# Patient Record
Sex: FEMALE | Race: BLACK OR AFRICAN AMERICAN | URBAN - METROPOLITAN AREA
[De-identification: names, ages, dates, MRNs, and addresses within clinical notes are randomized per-mention and may not be internally consistent; named-entity substitution may affect disease eponyms.]

---

## 2020-06-18 ENCOUNTER — OFFICE VISIT (OUTPATIENT)
Dept: URBAN - METROPOLITAN AREA TELEHEALTH 2 | Facility: TELEHEALTH | Age: 60
End: 2020-06-18
Payer: COMMERCIAL

## 2020-06-18 DIAGNOSIS — K29.70 GASTRITIS, UNSPECIFIED, WITHOUT BLEEDING: ICD-10-CM

## 2020-06-18 DIAGNOSIS — K21.0 GASTROESOPHAGEAL REFLUX DISEASE WITH ESOPHAGITIS: ICD-10-CM

## 2020-06-18 DIAGNOSIS — B96.81 HELICOBACTER PYLORI [H. PYLORI] AS THE CAUSE OF DISEASES CLASSIFIED ELSEWHERE: ICD-10-CM

## 2020-06-18 DIAGNOSIS — R63.5 WEIGHT GAIN: ICD-10-CM

## 2020-06-18 DIAGNOSIS — K63.5 POLYP OF COLON, UNSPECIFIED PART OF COLON, UNSPECIFIED TYPE: ICD-10-CM

## 2020-06-18 DIAGNOSIS — E66.01 MORBID OBESITY: ICD-10-CM

## 2020-06-18 DIAGNOSIS — K59.09 OTHER CONSTIPATION: ICD-10-CM

## 2020-06-18 PROCEDURE — G8417 CALC BMI ABV UP PARAM F/U: HCPCS | Performed by: INTERNAL MEDICINE

## 2020-06-18 PROCEDURE — 99213 OFFICE O/P EST LOW 20 MIN: CPT | Performed by: INTERNAL MEDICINE

## 2020-06-18 PROCEDURE — G8427 DOCREV CUR MEDS BY ELIG CLIN: HCPCS | Performed by: INTERNAL MEDICINE

## 2020-06-18 PROCEDURE — 1036F TOBACCO NON-USER: CPT | Performed by: INTERNAL MEDICINE

## 2020-06-18 PROCEDURE — G9903 PT SCRN TBCO ID AS NON USER: HCPCS | Performed by: INTERNAL MEDICINE

## 2020-06-18 PROCEDURE — 3017F COLORECTAL CA SCREEN DOC REV: CPT | Performed by: INTERNAL MEDICINE

## 2020-06-18 RX ORDER — OXYCODONE AND ACETAMINOPHEN 10; 325 MG/1; MG/1
TABLET ORAL
Qty: 0 | Refills: 0 | Status: DISCONTINUED | COMMUNITY
Start: 1900-01-01

## 2020-06-18 RX ORDER — OMEPRAZOLE 40 MG/1
1 CAPSULE 30 MINUTES BEFORE MORNING MEAL CAPSULE, DELAYED RELEASE ORAL ONCE A DAY
Status: ACTIVE | COMMUNITY

## 2020-06-18 RX ORDER — OXYCODONE HYDROCHLORIDE AND ACETAMINOPHEN 10; 325 MG/1; MG/1
TABLET ORAL
Qty: 0 | Refills: 0 | COMMUNITY
Start: 1900-01-01

## 2020-06-18 RX ORDER — OMEPRAZOLE 40 MG/1
1 CAPSULE 30 MINUTES BEFORE MORNING MEAL CAPSULE, DELAYED RELEASE ORAL ONCE A DAY
Qty: 90 | Refills: 0 | OUTPATIENT
Start: 2020-06-18

## 2020-06-18 NOTE — HPI-TODAY'S VISIT:
11/9/2018 54 yo lady referred by Dr. Bety Rubio. She says taking omeprazole during the day and then being dietarily compliant and not eating for 3 hours before lying down has her acid reflux controlled.  6/18/20 Still having "really bad acid reflux".  Says she still has significant throat burning, regurgitation, back pain, sometimes really severe and at night as well. Worse when she eats late" or something I should not eat". She tries to avoid her triggers including fried and spicy foods and is  not usually successful. She does not vomit and no dysphagia.  She has gained some weight with the pandemic.  She tries to eat dinner before 7 pm but ends up snacking afterwards. She can snack up till 11 pm when she goes to bed.  She takes Omeprazole daily; used to take only prn until recently. "Im on my last ones now". She is on 40 mg daily.

## 2020-07-16 ENCOUNTER — OFFICE VISIT (OUTPATIENT)
Dept: URBAN - METROPOLITAN AREA TELEHEALTH 2 | Facility: TELEHEALTH | Age: 60
End: 2020-07-16
Payer: COMMERCIAL

## 2020-07-16 DIAGNOSIS — E66.01 MORBID OBESITY: ICD-10-CM

## 2020-07-16 DIAGNOSIS — K63.5 POLYP OF COLON, UNSPECIFIED PART OF COLON, UNSPECIFIED TYPE: ICD-10-CM

## 2020-07-16 DIAGNOSIS — K59.09 OTHER CONSTIPATION: ICD-10-CM

## 2020-07-16 DIAGNOSIS — R63.5 WEIGHT GAIN: ICD-10-CM

## 2020-07-16 DIAGNOSIS — K29.70 GASTRITIS, UNSPECIFIED, WITHOUT BLEEDING: ICD-10-CM

## 2020-07-16 DIAGNOSIS — B96.81 HELICOBACTER PYLORI [H. PYLORI] AS THE CAUSE OF DISEASES CLASSIFIED ELSEWHERE: ICD-10-CM

## 2020-07-16 DIAGNOSIS — K21.0 GASTROESOPHAGEAL REFLUX DISEASE WITH ESOPHAGITIS: ICD-10-CM

## 2020-07-16 PROCEDURE — 99213 OFFICE O/P EST LOW 20 MIN: CPT | Performed by: INTERNAL MEDICINE

## 2020-07-16 PROCEDURE — G8417 CALC BMI ABV UP PARAM F/U: HCPCS | Performed by: INTERNAL MEDICINE

## 2020-07-16 PROCEDURE — G9903 PT SCRN TBCO ID AS NON USER: HCPCS | Performed by: INTERNAL MEDICINE

## 2020-07-16 PROCEDURE — 1036F TOBACCO NON-USER: CPT | Performed by: INTERNAL MEDICINE

## 2020-07-16 PROCEDURE — G8427 DOCREV CUR MEDS BY ELIG CLIN: HCPCS | Performed by: INTERNAL MEDICINE

## 2020-07-16 PROCEDURE — 3017F COLORECTAL CA SCREEN DOC REV: CPT | Performed by: INTERNAL MEDICINE

## 2020-07-16 RX ORDER — OMEPRAZOLE 40 MG/1
1 CAPSULE 30 MINUTES BEFORE MORNING MEAL CAPSULE, DELAYED RELEASE ORAL ONCE A DAY
Qty: 90 | Refills: 0 | COMMUNITY
Start: 2020-06-18

## 2020-07-16 RX ORDER — OMEPRAZOLE 40 MG/1
1 CAPSULE 30 MINUTES BEFORE MORNING MEAL CAPSULE, DELAYED RELEASE ORAL TWICE A DAY
Qty: 180 | Refills: 1 | OUTPATIENT

## 2020-07-16 RX ORDER — OMEPRAZOLE 40 MG/1
1 CAPSULE 30 MINUTES BEFORE MORNING MEAL CAPSULE, DELAYED RELEASE ORAL ONCE A DAY
COMMUNITY

## 2020-07-16 RX ORDER — OXYCODONE HYDROCHLORIDE AND ACETAMINOPHEN 10; 325 MG/1; MG/1
TABLET ORAL
Qty: 0 | Refills: 0 | COMMUNITY
Start: 1900-01-01

## 2020-07-16 NOTE — HPI-TODAY'S VISIT:
11/9/2018 56 yo lady referred by Dr. Bety Rubio. She says taking omeprazole during the day and then being dietarily compliant and not eating for 3 hours before lying down has her acid reflux controlled.  6/18/20 Still having "really bad acid reflux".  Says she still has significant throat burning, regurgitation, back pain, sometimes really severe and at night as well. Worse when she eats late" or something I should not eat". She tries to avoid her triggers including fried and spicy foods and is  not usually successful. She does not vomit and no dysphagia.  She has gained some weight with the pandemic.  She tries to eat dinner before 7 pm but ends up snacking afterwards. She can snack up till 11 pm when she goes to bed.  She takes Omeprazole daily; used to take only prn until recently. "Im on my last ones now". She is on 40 mg daily.   7/16/20 Omeprazole 40 mg bid "it really helped, one wasnt doing it". She had dinner about 7 pm last night. She had chips 2 nights ago at about 8 or 9 pm. "Gerri been trying not to have anything late". She can tell that she has a burning sensation in her throat or a globus sensation when she is dietarily indiscrete. She says her weight is "staying pretty much the same".

## 2021-06-01 ENCOUNTER — TELEPHONE ENCOUNTER (OUTPATIENT)
Dept: URBAN - METROPOLITAN AREA CLINIC 23 | Facility: CLINIC | Age: 61
End: 2021-06-01

## 2021-06-01 RX ORDER — OMEPRAZOLE 40 MG/1
1 CAPSULE 30 MINUTES BEFORE MORNING MEAL CAPSULE, DELAYED RELEASE ORAL ONCE A DAY
Qty: 30 | Refills: 0

## 2021-07-27 ENCOUNTER — LAB OUTSIDE AN ENCOUNTER (OUTPATIENT)
Dept: URBAN - METROPOLITAN AREA CLINIC 17 | Facility: CLINIC | Age: 61
End: 2021-07-27

## 2021-07-27 ENCOUNTER — OFFICE VISIT (OUTPATIENT)
Dept: URBAN - METROPOLITAN AREA CLINIC 17 | Facility: CLINIC | Age: 61
End: 2021-07-27
Payer: COMMERCIAL

## 2021-07-27 ENCOUNTER — WEB ENCOUNTER (OUTPATIENT)
Dept: URBAN - METROPOLITAN AREA CLINIC 17 | Facility: CLINIC | Age: 61
End: 2021-07-27

## 2021-07-27 DIAGNOSIS — K21.00 ALKALINE REFLUX ESOPHAGITIS: ICD-10-CM

## 2021-07-27 DIAGNOSIS — K29.60 ADENOPAPILLOMATOSIS GASTRICA: ICD-10-CM

## 2021-07-27 DIAGNOSIS — K59.09 OTHER CONSTIPATION: ICD-10-CM

## 2021-07-27 DIAGNOSIS — B96.81 HELICOBACTER PYLORI [H. PYLORI] AS THE CAUSE OF DISEASES CLASSIFIED ELSEWHERE: ICD-10-CM

## 2021-07-27 PROBLEM — 6185008: Status: ACTIVE | Noted: 2020-06-17

## 2021-07-27 PROBLEM — 89538001: Status: ACTIVE | Noted: 2020-06-17

## 2021-07-27 PROBLEM — 8943002: Status: ACTIVE | Noted: 2020-06-17

## 2021-07-27 PROBLEM — 238136002 MORBID OBESITY: Status: ACTIVE | Noted: 2021-07-27

## 2021-07-27 PROBLEM — 14760008: Status: ACTIVE | Noted: 2020-06-17

## 2021-07-27 PROCEDURE — 99214 OFFICE O/P EST MOD 30 MIN: CPT | Performed by: INTERNAL MEDICINE

## 2021-07-27 RX ORDER — OMEPRAZOLE 40 MG/1
1 CAPSULE 30 MINUTES BEFORE MORNING MEAL CAPSULE, DELAYED RELEASE ORAL TWICE A DAY
Qty: 180 | Refills: 3 | OUTPATIENT

## 2021-07-27 RX ORDER — OMEPRAZOLE 40 MG/1
1 CAPSULE 30 MINUTES BEFORE MORNING MEAL CAPSULE, DELAYED RELEASE ORAL ONCE A DAY
Status: DISCONTINUED | COMMUNITY

## 2021-07-27 RX ORDER — OXYCODONE HYDROCHLORIDE AND ACETAMINOPHEN 10; 325 MG/1; MG/1
TABLET ORAL
Qty: 0 | Refills: 0 | Status: ACTIVE | COMMUNITY
Start: 1900-01-01

## 2021-07-27 RX ORDER — OMEPRAZOLE 40 MG/1
1 CAPSULE 30 MINUTES BEFORE MORNING MEAL CAPSULE, DELAYED RELEASE ORAL ONCE A DAY
Qty: 30 | Refills: 0 | Status: ACTIVE | COMMUNITY

## 2021-07-27 NOTE — HPI-TODAY'S VISIT:
11/9/2018 56 yo lady referred by Dr. Bety Rubio. She says taking omeprazole during the day and then being dietarily compliant and not eating for 3 hours before lying down has her acid reflux controlled.  6/18/20 Still having "really bad acid reflux".  Says she still has significant throat burning, regurgitation, back pain, sometimes really severe and at night as well. Worse when she eats late" or something I should not eat". She tries to avoid her triggers including fried and spicy foods and is  not usually successful. She does not vomit and no dysphagia.  She has gained some weight with the pandemic.  She tries to eat dinner before 7 pm but ends up snacking afterwards. She can snack up till 11 pm when she goes to bed.  She takes Omeprazole daily; used to take only prn until recently. "Im on my last ones now". She is on 40 mg daily.   7/16/20 Omeprazole 40 mg bid "it really helped, one wasnt doing it". She had dinner about 7 pm last night. She had chips 2 nights ago at about 8 or 9 pm. "Gerri been trying not to have anything late". She can tell that she has a burning sensation in her throat or a globus sensation when she is dietarily indiscrete. She says her weight is "staying pretty much the same".   7/27/21 Doing fairly well Taking Omeprazole 40 mg once or twice a day/ When she takes bid and is dietarily discrete, sheis well controlled. Aggravating factors include caffeine, chocolate, sodas, late night eating.  She has regular BMs. Says Omeprazole works better than nexium.  Discussed colonoscopy. She is ready to schedule. Wants something for nausea.

## 2022-08-16 ENCOUNTER — TELEPHONE ENCOUNTER (OUTPATIENT)
Dept: URBAN - METROPOLITAN AREA CLINIC 92 | Facility: CLINIC | Age: 62
End: 2022-08-16

## 2022-08-16 RX ORDER — OMEPRAZOLE 40 MG/1
1 CAPSULE 30 MINUTES BEFORE MORNING MEAL CAPSULE, DELAYED RELEASE ORAL TWICE A DAY
Qty: 60 | Refills: 0

## 2023-11-02 ENCOUNTER — DASHBOARD ENCOUNTERS (OUTPATIENT)
Age: 63
End: 2023-11-02

## 2023-11-02 ENCOUNTER — OFFICE VISIT (OUTPATIENT)
Dept: URBAN - METROPOLITAN AREA CLINIC 17 | Facility: CLINIC | Age: 63
End: 2023-11-02
Payer: COMMERCIAL

## 2023-11-02 VITALS
TEMPERATURE: 97.3 F | HEART RATE: 60 BPM | WEIGHT: 293 LBS | BODY MASS INDEX: 47.09 KG/M2 | SYSTOLIC BLOOD PRESSURE: 106 MMHG | HEIGHT: 66 IN | DIASTOLIC BLOOD PRESSURE: 70 MMHG

## 2023-11-02 DIAGNOSIS — K59.09 OTHER CONSTIPATION: ICD-10-CM

## 2023-11-02 DIAGNOSIS — K63.5 POLYP OF COLON, UNSPECIFIED PART OF COLON, UNSPECIFIED TYPE: ICD-10-CM

## 2023-11-02 DIAGNOSIS — K76.0 HEPATIC STEATOSIS: ICD-10-CM

## 2023-11-02 DIAGNOSIS — K21.00 GASTRO-ESOPHAGEAL REFLUX DISEASE WITH ESOPHAGITIS: ICD-10-CM

## 2023-11-02 PROBLEM — 235919008: Status: ACTIVE | Noted: 2023-11-02

## 2023-11-02 PROBLEM — 197321007: Status: ACTIVE | Noted: 2023-11-02

## 2023-11-02 PROBLEM — 68496003: Status: ACTIVE | Noted: 2020-06-17

## 2023-11-02 PROBLEM — 266433003: Status: ACTIVE | Noted: 2020-06-17

## 2023-11-02 PROCEDURE — 99214 OFFICE O/P EST MOD 30 MIN: CPT | Performed by: INTERNAL MEDICINE

## 2023-11-02 RX ORDER — OXYCODONE HYDROCHLORIDE AND ACETAMINOPHEN 10; 325 MG/1; MG/1
TABLET ORAL
Qty: 0 | Refills: 0 | Status: ACTIVE | COMMUNITY
Start: 1900-01-01

## 2023-11-02 RX ORDER — DICYCLOMINE HYDROCHLORIDE 20 MG/1
TABLET ORAL
Qty: 20 TABLET | Status: ACTIVE | COMMUNITY

## 2023-11-02 RX ORDER — ONDANSETRON 4 MG/1
TABLET, ORALLY DISINTEGRATING ORAL
Qty: 9 TABLET | Status: ACTIVE | COMMUNITY

## 2023-11-02 RX ORDER — OMEPRAZOLE 40 MG/1
1 CAPSULE 30 MINUTES BEFORE MORNING MEAL CAPSULE, DELAYED RELEASE ORAL TWICE A DAY
Qty: 180 | Refills: 3 | OUTPATIENT

## 2023-11-02 RX ORDER — SUCRALFATE ORAL 1 G/10ML
SUSPENSION ORAL
Qty: 414 UNSPECIFIED | Status: ACTIVE | COMMUNITY

## 2023-11-02 RX ORDER — OMEPRAZOLE 40 MG/1
1 CAPSULE 30 MINUTES BEFORE MORNING MEAL CAPSULE, DELAYED RELEASE ORAL TWICE A DAY
Qty: 60 | Refills: 0 | Status: ACTIVE | COMMUNITY

## 2023-11-02 NOTE — HPI-TODAY'S VISIT:
11/9/2018 56 yo lady referred by Dr. Bety Rubio. She says taking omeprazole during the day and then being dietarily compliant and not eating for 3 hours before lying down has her acid reflux controlled.  6/18/20 Still having "really bad acid reflux".  Says she still has significant throat burning, regurgitation, back pain, sometimes really severe and at night as well. Worse when she eats late" or something I should not eat". She tries to avoid her triggers including fried and spicy foods and is  not usually successful. She does not vomit and no dysphagia.  She has gained some weight with the pandemic.  She tries to eat dinner before 7 pm but ends up snacking afterwards. She can snack up till 11 pm when she goes to bed.  She takes Omeprazole daily; used to take only prn until recently. "Im on my last ones now". She is on 40 mg daily.   7/16/20 Omeprazole 40 mg bid "it really helped, one wasnt doing it". She had dinner about 7 pm last night. She had chips 2 nights ago at about 8 or 9 pm. "Gerri been trying not to have anything late". She can tell that she has a burning sensation in her throat or a globus sensation when she is dietarily indiscrete. She says her weight is "staying pretty much the same".   7/27/21 Doing fairly well Taking Omeprazole 40 mg once or twice a day/ When she takes bid and is dietarily discrete, sheis well controlled. Aggravating factors include caffeine, chocolate, sodas, late night eating.  She has regular BMs. Says Omeprazole works better than nexium.  Discussed colonoscopy. She is ready to schedule. Wants something for nausea.  11/2/23 64 yo lady who is here as post ER visit for abd pain Has had 4 episodes - cramping, upper abdomen, lasting hours, no nausea or vomiting. starts with diarrhea.  BMs are qod and unchanged Records from [place] [facility] show CT showing gallstones, heaptic steatosis, L adrenal adenoma.  Labs show nl CBC and CMP. LIver enzymes are normal.

## 2024-02-15 ENCOUNTER — OV EP (OUTPATIENT)
Dept: URBAN - METROPOLITAN AREA CLINIC 17 | Facility: CLINIC | Age: 64
End: 2024-02-15